# Patient Record
Sex: FEMALE | Race: OTHER
[De-identification: names, ages, dates, MRNs, and addresses within clinical notes are randomized per-mention and may not be internally consistent; named-entity substitution may affect disease eponyms.]

---

## 2018-03-19 ENCOUNTER — TRANSCRIPTION ENCOUNTER (OUTPATIENT)
Age: 23
End: 2018-03-19

## 2018-05-09 ENCOUNTER — EMERGENCY (EMERGENCY)
Facility: HOSPITAL | Age: 23
LOS: 0 days | Discharge: HOME | End: 2018-05-09
Attending: EMERGENCY MEDICINE | Admitting: EMERGENCY MEDICINE

## 2018-05-09 VITALS
OXYGEN SATURATION: 99 % | WEIGHT: 130.07 LBS | RESPIRATION RATE: 18 BRPM | DIASTOLIC BLOOD PRESSURE: 70 MMHG | SYSTOLIC BLOOD PRESSURE: 116 MMHG | HEART RATE: 68 BPM | TEMPERATURE: 97 F

## 2018-05-09 VITALS
OXYGEN SATURATION: 98 % | SYSTOLIC BLOOD PRESSURE: 108 MMHG | RESPIRATION RATE: 18 BRPM | HEART RATE: 89 BPM | DIASTOLIC BLOOD PRESSURE: 65 MMHG

## 2018-05-09 DIAGNOSIS — R11.0 NAUSEA: ICD-10-CM

## 2018-05-09 DIAGNOSIS — R42 DIZZINESS AND GIDDINESS: ICD-10-CM

## 2018-05-09 DIAGNOSIS — N93.9 ABNORMAL UTERINE AND VAGINAL BLEEDING, UNSPECIFIED: ICD-10-CM

## 2018-05-09 DIAGNOSIS — N92.6 IRREGULAR MENSTRUATION, UNSPECIFIED: ICD-10-CM

## 2018-05-09 LAB
HCT VFR BLD CALC: 39.4 % — SIGNIFICANT CHANGE UP (ref 37–47)
HGB BLD-MCNC: 13.7 G/DL — SIGNIFICANT CHANGE UP (ref 12–16)
MCHC RBC-ENTMCNC: 30.2 PG — SIGNIFICANT CHANGE UP (ref 27–31)
MCHC RBC-ENTMCNC: 34.8 G/DL — SIGNIFICANT CHANGE UP (ref 32–37)
MCV RBC AUTO: 87 FL — SIGNIFICANT CHANGE UP (ref 81–99)
NRBC # BLD: 0 /100 WBCS — SIGNIFICANT CHANGE UP (ref 0–0)
PLATELET # BLD AUTO: 134 K/UL — SIGNIFICANT CHANGE UP (ref 130–400)
RBC # BLD: 4.53 M/UL — SIGNIFICANT CHANGE UP (ref 4.2–5.4)
RBC # FLD: 13 % — SIGNIFICANT CHANGE UP (ref 11.5–14.5)
WBC # BLD: 6.15 K/UL — SIGNIFICANT CHANGE UP (ref 4.8–10.8)
WBC # FLD AUTO: 6.15 K/UL — SIGNIFICANT CHANGE UP (ref 4.8–10.8)

## 2018-05-09 NOTE — ED PROVIDER NOTE - PHYSICAL EXAMINATION
GENERAL:  well appearing, non-toxic female in no acute distress  SKIN: skin warm, pink and dry. MMM. no pallor.  PULM: CTAB. Normal respiratory effort. No respiratory distress. No wheezes, stridor, rales or rhonchi. No retractions  CV: RRR, no M/R/G.   ABD: Soft, non-tender, non-distended.  NEURO: A+Ox3, no sensory/motor deficits

## 2018-05-09 NOTE — ED PROVIDER NOTE - ATTENDING CONTRIBUTION TO CARE
Pt reports irregular periods recently. Thinks she may be anemic. Hx of anemia in the past. Pt recently in new job. Not resting. On exam S1S2 rrr, lungs clear, abdomen is soft nontender, ext neg for edema or tenderness. Well appearing. Should follow up pmd and gyn.

## 2018-05-09 NOTE — ED PROVIDER NOTE - OBJECTIVE STATEMENT
24 yo female with no significant pmh presents to the ED c/o irregular vaginal bleeding x 1 month. Patient states shes had her menstrual cycle 3 times. Patient states she had her menses April 14th for 4/5 days, then the last week in April for 4/5 days, then again 3 days ago. States the bleeding is heavy with clots. Patient states she does have a history of anemia after she gave birth to her son years ago, never requiring a blood transfusion. Associated with intermittent nausea, dizziness, and right pelvic cramping. 22 yo female with no significant pmh presents to the ED c/o irregular vaginal bleeding x 1 month. Patient states shes had her menstrual cycle 3 times. Patient states she had her menses April 14th for 4/5 days, then the last week in April for 4/5 days, then again 3 days ago. States the bleeding is heavy with clots. Patient states she does have a history of anemia after she gave birth to her son years ago, never requiring a blood transfusion. Associated with intermittent nausea, dizziness, and right pelvic cramping. Denies fever, chills, chest pain, sob, syncope, UE/LE weakness or paresthesias, urinary sxs, flank pain.

## 2018-05-09 NOTE — ED PROVIDER NOTE - PROGRESS NOTE DETAILS
results discussed with patient, copy given, recommend to follow up with gyn. Understands return precautions.

## 2018-05-09 NOTE — ED PROVIDER NOTE - NS ED ROS FT
Constitutional: no fever, chills or generalized weakness  ENT: no URI sxs, no throat pain, no change in voice, no excessive drooling, no ear pain/discharge, no hearing changes.   Cardiovascular: no chest pain, no sob, no syncope , no palpitations  Respiratory: no cough, no shortness of breath  Gastrointestinal: no nausea, vomiting or diarrhea. no abdominal pain.   : + vaginal bleeding. no pelvic pain. No discharge. no urinary sxs, no flank pain.   Integumentary: no rash or skin changes. no edema  Neurological: + intermittent dizziness. no syncope. no headache, no visual changes, no UE/LE weakness or paresthesias.

## 2020-04-26 ENCOUNTER — MESSAGE (OUTPATIENT)
Age: 25
End: 2020-04-26

## 2020-05-05 LAB
SARS-COV-2 IGG SERPL IA-ACNC: 0.1 INDEX
SARS-COV-2 IGG SERPL QL IA: NEGATIVE

## 2020-05-06 ENCOUNTER — APPOINTMENT (OUTPATIENT)
Dept: DISASTER EMERGENCY | Facility: HOSPITAL | Age: 25
End: 2020-05-06

## 2022-03-16 ENCOUNTER — TRANSCRIPTION ENCOUNTER (OUTPATIENT)
Age: 27
End: 2022-03-16

## 2024-05-27 NOTE — ED ADULT NURSE NOTE - PMH
PRINCIPAL DISCHARGE DIAGNOSIS  Diagnosis: Altered mental status  Assessment and Plan of Treatment: You presented with complaints of progressive confusion secondary to suspected mets to brain probably from Renal Cell Carcinoma. Multiple bilateral pulmonary nodules with large R pleural effusion and small L pleural effusion also found on imaging most likely malignancy related  .Patient needed CT imaging w/ contrast for further investigation and managment and a shared decision making was done to consult hospice. Hospice were consulted and arrangement for home hospice was done.      SECONDARY DISCHARGE DIAGNOSES  Diagnosis: Ambulatory dysfunction  Assessment and Plan of Treatment:      No pertinent past medical history PRINCIPAL DISCHARGE DIAGNOSIS  Diagnosis: Altered mental status  Assessment and Plan of Treatment: You presented with complaints of progressive confusion secondary to suspected mets to brain probably from Renal Cell Carcinoma. Multiple bilateral pulmonary nodules with large R pleural effusion and small L pleural effusion also found on imaging most likely malignancy related. Patient needed CT imaging w/ contrast for further investigation and managment and a shared decision making was done to consult hospice. Hospice were consulted and arrangement for SNF with hospice was done.      SECONDARY DISCHARGE DIAGNOSES  Diagnosis: Ambulatory dysfunction  Assessment and Plan of Treatment:

## 2025-01-31 NOTE — ED ADULT NURSE NOTE - CINV DISCH TEACH PARTICIP
Lab Results   Component Value Date    EGFR 58 01/30/2025    EGFR 61 01/27/2025    EGFR 62 01/23/2025    CREATININE 0.87 01/30/2025    CREATININE 0.84 01/27/2025    CREATININE 0.83 01/23/2025   At baseline   Patient
